# Patient Record
Sex: MALE
[De-identification: names, ages, dates, MRNs, and addresses within clinical notes are randomized per-mention and may not be internally consistent; named-entity substitution may affect disease eponyms.]

---

## 2022-08-12 PROBLEM — Z00.00 ENCOUNTER FOR PREVENTIVE HEALTH EXAMINATION: Status: ACTIVE | Noted: 2022-08-12

## 2022-08-16 ENCOUNTER — APPOINTMENT (OUTPATIENT)
Dept: OTOLARYNGOLOGY | Facility: CLINIC | Age: 42
End: 2022-08-16

## 2022-08-16 DIAGNOSIS — Z86.39 PERSONAL HISTORY OF OTHER ENDOCRINE, NUTRITIONAL AND METABOLIC DISEASE: ICD-10-CM

## 2022-08-16 DIAGNOSIS — R04.0 EPISTAXIS: ICD-10-CM

## 2022-08-16 DIAGNOSIS — J34.2 DEVIATED NASAL SEPTUM: ICD-10-CM

## 2022-08-16 DIAGNOSIS — Z72.89 OTHER PROBLEMS RELATED TO LIFESTYLE: ICD-10-CM

## 2022-08-16 PROCEDURE — 31238 NSL/SINS NDSC SRG NSL HEMRRG: CPT

## 2022-08-16 PROCEDURE — 99203 OFFICE O/P NEW LOW 30 MIN: CPT | Mod: 25

## 2022-08-16 RX ORDER — MULTIVITAMIN
CAPSULE ORAL
Refills: 0 | Status: ACTIVE | COMMUNITY

## 2022-08-16 NOTE — ASSESSMENT
[FreeTextEntry1] : Bleeding was controlled.\par Wound care was discussed.\par Follow-up in 2 to 3 weeks.\par \par Literature for epistaxis management given\par - Patient told to avoid heavy lifting, bending, straining, nose blowing and nasal manipulation\par - Stop aspirin, NSAIDS if able to\par - Humidifier recommended\par - Nasal saline spray PRN \par - Moisturizing gel or bacitracin b.i.d.\par \par

## 2022-08-16 NOTE — PROCEDURE
[FreeTextEntry1] : Endoscopic control of epistaxis. [FreeTextEntry2] : Left-sided epistaxis. [FreeTextEntry3] : PROCEDURE- CAUTERIZATION OF EPISTAXIS\par \par Indication: Epistaxis\par Procedure: Cauterization of anterior epistaxis Endoscopic cauterization of epistaxis\par Surgeon: Dr. Jurado\par Consent: Options for treatment were reviewed with the patient (including placement of packing).  The risks of the procedure were reviewed and include reaction to the anesthetic, bleeding, infection, scarring/cosmetic deformity, septal perforation, need for further procedures or packing. The patient wished to proceed.\par Anesthetic: Topical tetracaine and oxymetazoline.  Injection with 1% lidocaine with 1:100,000 epinephrine.\par \par Procedure: The patient was positioned comfortably. The nose was anesthetized with topical tetracaine and decongested with oxymetazoline. After an adequate amount of time, approximately    [] cc of 1% lidocaine with 1:100,000 epinephrine was injected into the septal mucosa near the source of bleeding.  A rigid endoscope was used. After the nose was anesthetized, cauterization of the bleeding source on the LEFT septum was performed using silver nitrate .  Surgicel bacitracin was applied. The patient tolerated the procedure well. There were no complications.  \par

## 2022-08-16 NOTE — HISTORY OF PRESENT ILLNESS
[de-identified] : Initial visit.\par His chief complaint is " Frequent nosebleeds, currently left nostril".\par \par He reports that since childhood he has had epistaxis.  This developed bilaterally.  This can develop throughout the year.\par He does not have a history of bleeding dyscrasias.\par \par He last had his nose cauterized about 10 years ago.\par \par His last nosebleed was yesterday from the left side.

## 2022-09-06 ENCOUNTER — APPOINTMENT (OUTPATIENT)
Dept: OTOLARYNGOLOGY | Facility: CLINIC | Age: 42
End: 2022-09-06